# Patient Record
Sex: FEMALE | Race: WHITE | NOT HISPANIC OR LATINO | Employment: FULL TIME | URBAN - METROPOLITAN AREA
[De-identification: names, ages, dates, MRNs, and addresses within clinical notes are randomized per-mention and may not be internally consistent; named-entity substitution may affect disease eponyms.]

---

## 2017-04-04 ENCOUNTER — ALLSCRIPTS OFFICE VISIT (OUTPATIENT)
Dept: OTHER | Facility: OTHER | Age: 27
End: 2017-04-04

## 2017-05-11 ENCOUNTER — APPOINTMENT (OUTPATIENT)
Dept: LAB | Facility: CLINIC | Age: 27
End: 2017-05-11
Payer: COMMERCIAL

## 2017-05-11 ENCOUNTER — TRANSCRIBE ORDERS (OUTPATIENT)
Dept: LAB | Facility: CLINIC | Age: 27
End: 2017-05-11

## 2017-05-11 DIAGNOSIS — E03.9 UNSPECIFIED HYPOTHYROIDISM: Primary | ICD-10-CM

## 2017-05-11 LAB — TSH SERPL DL<=0.05 MIU/L-ACNC: 2.45 UIU/ML (ref 0.36–3.74)

## 2017-05-11 PROCEDURE — 84443 ASSAY THYROID STIM HORMONE: CPT

## 2017-05-11 PROCEDURE — 36415 COLL VENOUS BLD VENIPUNCTURE: CPT

## 2017-05-25 ENCOUNTER — ALLSCRIPTS OFFICE VISIT (OUTPATIENT)
Dept: OTHER | Facility: OTHER | Age: 27
End: 2017-05-25

## 2017-05-25 DIAGNOSIS — Z12.31 ENCOUNTER FOR SCREENING MAMMOGRAM FOR MALIGNANT NEOPLASM OF BREAST: ICD-10-CM

## 2017-05-25 PROCEDURE — 87491 CHLMYD TRACH DNA AMP PROBE: CPT | Performed by: OBSTETRICS & GYNECOLOGY

## 2017-05-25 PROCEDURE — G0145 SCR C/V CYTO,THINLAYER,RESCR: HCPCS | Performed by: OBSTETRICS & GYNECOLOGY

## 2017-05-25 PROCEDURE — 87591 N.GONORRHOEAE DNA AMP PROB: CPT | Performed by: OBSTETRICS & GYNECOLOGY

## 2017-05-27 ENCOUNTER — LAB REQUISITION (OUTPATIENT)
Dept: LAB | Facility: HOSPITAL | Age: 27
End: 2017-05-27
Payer: COMMERCIAL

## 2017-05-27 DIAGNOSIS — Z11.3 ENCOUNTER FOR SCREENING FOR INFECTIONS WITH PREDOMINANTLY SEXUAL MODE OF TRANSMISSION: ICD-10-CM

## 2017-05-27 DIAGNOSIS — Z12.4 ENCOUNTER FOR SCREENING FOR MALIGNANT NEOPLASM OF CERVIX: ICD-10-CM

## 2017-06-01 LAB
CHLAMYDIA DNA CVX QL NAA+PROBE: ABNORMAL
N GONORRHOEA DNA GENITAL QL NAA+PROBE: ABNORMAL

## 2017-06-09 LAB
LAB AP GYN PRIMARY INTERPRETATION: NORMAL
LAB AP LMP: NORMAL
Lab: NORMAL

## 2017-06-23 ENCOUNTER — GENERIC CONVERSION - ENCOUNTER (OUTPATIENT)
Dept: OTHER | Facility: OTHER | Age: 27
End: 2017-06-23

## 2017-07-20 ENCOUNTER — ALLSCRIPTS OFFICE VISIT (OUTPATIENT)
Dept: OTHER | Facility: OTHER | Age: 27
End: 2017-07-20

## 2017-07-21 ENCOUNTER — LAB REQUISITION (OUTPATIENT)
Dept: LAB | Facility: HOSPITAL | Age: 27
End: 2017-07-21
Payer: COMMERCIAL

## 2017-07-21 DIAGNOSIS — Z11.3 ENCOUNTER FOR SCREENING FOR INFECTIONS WITH PREDOMINANTLY SEXUAL MODE OF TRANSMISSION: ICD-10-CM

## 2017-07-21 PROCEDURE — 87491 CHLMYD TRACH DNA AMP PROBE: CPT | Performed by: OBSTETRICS & GYNECOLOGY

## 2017-07-21 PROCEDURE — 87591 N.GONORRHOEAE DNA AMP PROB: CPT | Performed by: OBSTETRICS & GYNECOLOGY

## 2017-07-24 LAB
CHLAMYDIA DNA CVX QL NAA+PROBE: NORMAL
N GONORRHOEA DNA GENITAL QL NAA+PROBE: NORMAL

## 2017-08-01 ENCOUNTER — GENERIC CONVERSION - ENCOUNTER (OUTPATIENT)
Dept: OTHER | Facility: OTHER | Age: 27
End: 2017-08-01

## 2017-08-25 ENCOUNTER — ALLSCRIPTS OFFICE VISIT (OUTPATIENT)
Dept: OTHER | Facility: OTHER | Age: 27
End: 2017-08-25

## 2018-01-11 NOTE — MISCELLANEOUS
Message   Recorded as Task   Date: 07/31/2017 06:44 AM, Created By: Maureen Hastings   Task Name: Go to Result   Assigned To: Rossana Burgess   Regarding Patient: Carlos Srivastava, Status: In Progress   Asuncion Mansfield - 31 Jul 2017 6:44 AM     TASK CREATED  negative chlamydia and gonorrhea   Kyra Suh - 01 Aug 2017 12:56 PM     TASK IN PROGRESS   Pt informed  Active Problems    1  Celiac disease (579 0) (K90 0)   2  Cervical cancer screening (V76 2) (Z12 4)   3  Chlamydia (079 98) (A74 9)   4  Encounter for routine gynecological examination (V72 31) (Z01 419)   5  Hypothyroidism (244 9) (E03 9)   6  Screening for STDs (sexually transmitted diseases) (V74 5) (Z11 3)   7  Visit for screening mammogram (V76 12) (Z12 31)    Current Meds   1  Levothyroxine Sodium 25 MCG Oral Tablet (Synthroid); take 1 tablet every day; Therapy: 01ACY2956 to (Opal Solum)  Requested for: 27EQW3278; Last   Rx:[id number] Ordered   2  Womens One Daily TABS; TAKE 1 TABLET DAILY; Therapy: (Recorded:04Apr2017) to Recorded    Allergies    1  No Known Drug Allergies    2  Gluten   3   Pollen    Signatures   Electronically signed by : Butch Blackwell, ; Aug  1 2017  4:27PM EST                       (Author)

## 2018-01-12 VITALS
HEIGHT: 65 IN | WEIGHT: 129.5 LBS | HEART RATE: 72 BPM | RESPIRATION RATE: 16 BRPM | DIASTOLIC BLOOD PRESSURE: 64 MMHG | SYSTOLIC BLOOD PRESSURE: 120 MMHG | TEMPERATURE: 98.5 F | BODY MASS INDEX: 21.58 KG/M2

## 2018-01-12 VITALS
HEIGHT: 65 IN | BODY MASS INDEX: 21.68 KG/M2 | WEIGHT: 130.13 LBS | SYSTOLIC BLOOD PRESSURE: 119 MMHG | DIASTOLIC BLOOD PRESSURE: 70 MMHG

## 2018-01-14 VITALS
BODY MASS INDEX: 21.76 KG/M2 | HEART RATE: 72 BPM | RESPIRATION RATE: 16 BRPM | HEIGHT: 65 IN | WEIGHT: 130.6 LBS | DIASTOLIC BLOOD PRESSURE: 64 MMHG | SYSTOLIC BLOOD PRESSURE: 114 MMHG

## 2018-01-14 VITALS
BODY MASS INDEX: 21.91 KG/M2 | SYSTOLIC BLOOD PRESSURE: 120 MMHG | DIASTOLIC BLOOD PRESSURE: 76 MMHG | WEIGHT: 131.5 LBS | HEIGHT: 65 IN

## 2018-01-14 NOTE — MISCELLANEOUS
Message   Date: 23 Jun 2017 2:47 PM EST, Recorded By: Audra Longo For: Regi Khoi: Liza Montilla, Self   Phone: [phone] (Home)   Reason: Results Inquiry   Patient called for pap result  Informed WNL, letter is in the mail  Also discussed + Chlamydia  Pt has Rx and will take it today  Has appointment for ADRIANNA  Active Problems    1  Celiac disease (579 0) (K90 0)   2  Cervical cancer screening (V76 2) (Z12 4)   3  Chlamydia (079 98) (A74 9)   4  Encounter for routine gynecological examination (V72 31) (Z01 419)   5  Hypothyroidism (244 9) (E03 9)   6  Screening for STDs (sexually transmitted diseases) (V74 5) (Z11 3)   7  Visit for screening mammogram (V76 12) (Z12 31)    Current Meds   1  Azithromycin 500 MG Oral Tablet; TAKE 2 TABLET Now Take both tablets by mouth; Therapy: 66GWS1902 to (USTWZBDY:45WGH4195)  Requested for: 54TRN7765; Last   Rx:[id number] Ordered   2  Levothyroxine Sodium 25 MCG Oral Tablet (Synthroid); take 1 tablet every day; Therapy: 75VBU7312 to (Collins Etienne)  Requested for: 01COG0776; Last   Rx:[id number] Ordered   3  Womens One Daily TABS; TAKE 1 TABLET DAILY; Therapy: (Recorded:04Apr2017) to Recorded    Allergies    1  No Known Drug Allergies    2  Gluten   3   Pollen    Signatures   Electronically signed by : Jonelle Meza, ; Jun 23 2017  2:49PM EST                       (Author)

## 2018-07-25 DIAGNOSIS — E03.9 HYPOTHYROIDISM, UNSPECIFIED TYPE: Primary | ICD-10-CM

## 2018-07-26 RX ORDER — LEVOTHYROXINE SODIUM 0.03 MG/1
TABLET ORAL
Qty: 30 TABLET | Refills: 0 | Status: SHIPPED | OUTPATIENT
Start: 2018-07-26 | End: 2018-09-11 | Stop reason: SDUPTHER

## 2018-09-11 ENCOUNTER — TELEPHONE (OUTPATIENT)
Dept: FAMILY MEDICINE CLINIC | Facility: CLINIC | Age: 28
End: 2018-09-11

## 2018-09-11 DIAGNOSIS — E03.9 HYPOTHYROIDISM, UNSPECIFIED TYPE: ICD-10-CM

## 2018-09-11 DIAGNOSIS — E03.9 HYPOTHYROIDISM, UNSPECIFIED TYPE: Primary | ICD-10-CM

## 2018-09-11 RX ORDER — LEVOTHYROXINE SODIUM 0.03 MG/1
TABLET ORAL
Qty: 30 TABLET | Refills: 0 | Status: SHIPPED | OUTPATIENT
Start: 2018-09-11 | End: 2021-02-25 | Stop reason: SDUPTHER

## 2018-09-11 NOTE — TELEPHONE ENCOUNTER
Left voicemail    ----- Message from Mary Kate Radford Dr sent at 9/11/2018  9:33 AM EDT -----  Call pt: got refill request for her synthroid  I refilled for 30 days but she needs labs   I printed script

## 2018-10-17 DIAGNOSIS — E03.9 HYPOTHYROIDISM, UNSPECIFIED TYPE: ICD-10-CM

## 2018-10-17 RX ORDER — LEVOTHYROXINE SODIUM 0.03 MG/1
TABLET ORAL
Qty: 30 TABLET | Refills: 0 | OUTPATIENT
Start: 2018-10-17

## 2018-10-17 NOTE — TELEPHONE ENCOUNTER
Back on September 11 we gave her #30 and a lab slip, labs were never done   Patient needs to get labs done and make an appt we can call enough meds until this time

## 2020-08-31 ENCOUNTER — OFFICE VISIT (OUTPATIENT)
Dept: URGENT CARE | Facility: CLINIC | Age: 30
End: 2020-08-31
Payer: COMMERCIAL

## 2020-08-31 VITALS
RESPIRATION RATE: 18 BRPM | TEMPERATURE: 98.2 F | OXYGEN SATURATION: 99 % | HEART RATE: 62 BPM | HEIGHT: 65 IN | WEIGHT: 135 LBS | BODY MASS INDEX: 22.49 KG/M2

## 2020-08-31 DIAGNOSIS — Z11.59 SCREENING FOR VIRAL DISEASE: Primary | ICD-10-CM

## 2020-08-31 PROCEDURE — G0382 LEV 3 HOSP TYPE B ED VISIT: HCPCS | Performed by: FAMILY MEDICINE

## 2020-08-31 PROCEDURE — U0003 INFECTIOUS AGENT DETECTION BY NUCLEIC ACID (DNA OR RNA); SEVERE ACUTE RESPIRATORY SYNDROME CORONAVIRUS 2 (SARS-COV-2) (CORONAVIRUS DISEASE [COVID-19]), AMPLIFIED PROBE TECHNIQUE, MAKING USE OF HIGH THROUGHPUT TECHNOLOGIES AS DESCRIBED BY CMS-2020-01-R: HCPCS | Performed by: FAMILY MEDICINE

## 2020-08-31 RX ORDER — LEVOTHYROXINE SODIUM 0.03 MG/1
1 TABLET ORAL DAILY
COMMUNITY
Start: 2013-03-25 | End: 2021-02-25 | Stop reason: SDUPTHER

## 2020-08-31 NOTE — PROGRESS NOTES
[facility] [facility]        NAME: Brittney Gar is a 27 y o  female  : 1990    MRN: [id number]  DATE: 2020  TIME: 3:49 PM    Assessment and Plan   Screening for viral disease [Z11 59]  1  Screening for viral disease  PAT Covid Screening         Patient Instructions   There are no Patient Instructions on file for this visit  Proceed to  ER if symptoms worsen  Chief Complaint     Chief Complaint   Patient presents with   [address] [place] [address] screening         History of Present Illness       Patient presents for pre-employment COVID testing  Patient denies any symptoms, no fever no chills no cough no shortness of breath no fever no new onset loss of taste or smell no abdominal discomfort nausea vomiting or diarrhea  She feels well and will be applying for a job as a   All vital signs stable      Review of Systems   Review of Systems   Constitutional: Negative  HENT: Negative  Eyes: Negative  Respiratory: Negative  Cardiovascular: Negative  Gastrointestinal: Negative  Musculoskeletal: Negative  Neurological: Negative  Current Medications       Current Outpatient Medications:     levothyroxine 25 mcg tablet, Take 1 tablet by mouth daily, Disp: , Rfl:     levothyroxine 25 mcg tablet, TAKE 1 TABLET BY MOUTH EVERY DAY, Disp: 30 tablet, Rfl: 0    Current Allergies     Allergies as of 2020 - Reviewed 2020   Allergen Reaction Noted    Gluten meal Anxiety, GI Intolerance, and Rash 2017            The following portions of the patient's history were reviewed and updated as appropriate: allergies, current medications, past family history, past medical history, past social history, past surgical history and problem list      No past medical history on file  No past surgical history on file  No family history on file  Medications have been verified          Objective   Pulse 62   Temp 98 2 °F (36 8 °C)   Resp 18   Ht 5' 5" (1 651 m)   Wt 61 2 kg (135 lb)   SpO2 99%   BMI 22 47 kg/m²        Physical Exam     Physical Exam  Constitutional:       General: She is not in acute distress  Appearance: Normal appearance  She is not ill-appearing  Neurological:      Mental Status: She is alert

## 2020-09-01 LAB — SARS-COV-2 RNA RESP QL NAA+PROBE: NEGATIVE

## 2021-02-02 ENCOUNTER — NURSE TRIAGE (OUTPATIENT)
Dept: OTHER | Facility: OTHER | Age: 31
End: 2021-02-02

## 2021-02-02 DIAGNOSIS — Z11.9 ENCOUNTER FOR SCREENING FOR INFECTIOUS AND PARASITIC DISEASES, UNSPECIFIED: Primary | ICD-10-CM

## 2021-02-02 NOTE — TELEPHONE ENCOUNTER
Pt is requesting a covid test and does not have a [facility] PCP  Order placed  Pt informed of closest testing site and was advised of hours of operation, address, to wear a mask, and to stay in the car  Pt already has a [employer] account to check for results  Pt does not wish to establish care with a [facility] PCP at this time  (0) independent

## 2021-02-02 NOTE — TELEPHONE ENCOUNTER
Reason for Disposition   [1] COVID-19 EXPOSURE (Close Contact) AND [2] within last 14 days BUT [3] NO symptoms    Answer Assessment - Initial Assessment Questions  Were you within 6 feet or less, for up to 15 minutes or more with a person that has a confirmed COVID-19 test?        Yes    What was the date of your exposure? 1/27/21    Are you experiencing any symptoms attributed to the virus?  (Assess for SOB, cough, fever, difficulty breathing)        Denies    HIGH RISK: Do you have any history heart or lung conditions, weakened immune system, diabetes, Asthma, CHF, HIV, COPD, Chemo, renal failure, sickle cell, etc?        Denies    PREGNANCY: Are you pregnant or did you recently give birth?         Denies    Protocols used: CORONAVIRUS (COVID-19) EXPOSURE-ADULT-[place]

## 2021-02-02 NOTE — TELEPHONE ENCOUNTER
Regarding: COVID - Exposure  ----- Message from Darron Lopez sent at 2/2/2021 12:14 PM EST -----  "I was exposed to someone who tested positive - I was in there house for an extended period of time"     NOTE: She was not within 6 ft of this person - she had a mask on and the other person didn't

## 2021-02-25 ENCOUNTER — OFFICE VISIT (OUTPATIENT)
Dept: FAMILY MEDICINE CLINIC | Facility: CLINIC | Age: 31
End: 2021-02-25
Payer: COMMERCIAL

## 2021-02-25 VITALS
TEMPERATURE: 98.9 F | SYSTOLIC BLOOD PRESSURE: 110 MMHG | HEIGHT: 65 IN | DIASTOLIC BLOOD PRESSURE: 70 MMHG | RESPIRATION RATE: 14 BRPM | HEART RATE: 60 BPM | BODY MASS INDEX: 21.19 KG/M2 | WEIGHT: 127.2 LBS

## 2021-02-25 DIAGNOSIS — E03.8 OTHER SPECIFIED HYPOTHYROIDISM: ICD-10-CM

## 2021-02-25 DIAGNOSIS — E03.9 HYPOTHYROIDISM, UNSPECIFIED TYPE: Primary | ICD-10-CM

## 2021-02-25 DIAGNOSIS — Z23 NEED FOR TETANUS BOOSTER: ICD-10-CM

## 2021-02-25 PROBLEM — J30.9 ALLERGIC RHINITIS: Status: ACTIVE | Noted: 2017-08-25

## 2021-02-25 PROBLEM — K90.0 CELIAC DISEASE: Status: ACTIVE | Noted: 2017-04-04

## 2021-02-25 PROCEDURE — 99203 OFFICE O/P NEW LOW 30 MIN: CPT | Performed by: PHYSICIAN ASSISTANT

## 2021-02-25 PROCEDURE — 90471 IMMUNIZATION ADMIN: CPT

## 2021-02-25 PROCEDURE — 90714 TD VACC NO PRESV 7 YRS+ IM: CPT

## 2021-02-25 PROCEDURE — 1036F TOBACCO NON-USER: CPT | Performed by: PHYSICIAN ASSISTANT

## 2021-02-25 PROCEDURE — 3725F SCREEN DEPRESSION PERFORMED: CPT | Performed by: PHYSICIAN ASSISTANT

## 2021-02-25 PROCEDURE — 3008F BODY MASS INDEX DOCD: CPT | Performed by: PHYSICIAN ASSISTANT

## 2021-02-25 RX ORDER — LEVOTHYROXINE SODIUM 0.03 MG/1
25 TABLET ORAL DAILY
Qty: 90 TABLET | Refills: 1 | Status: SHIPPED | OUTPATIENT
Start: 2021-02-25 | End: 2021-09-20

## 2021-02-25 NOTE — PROGRESS NOTES
Assessment/Plan:    1  Hypothyroidism, unspecified type    - continue with levothyroxine 25 mcg daily, repeat TSH yearly in summer to stay on same schedule  - levothyroxine 25 mcg tablet; Take 1 tablet (25 mcg total) by mouth daily  Dispense: 90 tablet; Refill: 1  - TSH, 3rd generation with Free T4 reflex; Future    2  Need for tetanus booster    - TD VACCINE GREATER THAN OR EQUAL TO 8YO PRESERVATIVE FREE IM    PAP due 2022 with Dr Jaimee Bender    F/u 1 year or sooner if needed    Subjective:   Chief Complaint   Patient presents with    Returning pt to be established      Patient ID: Lesli Bray is a 27 y o  female  Patient here for refills levothyroxine, started 2011, been on the same dose since then  Gets labs done yearly in summer  No complaints at this time  Here to re establish care        The following portions of the patient's history were reviewed and updated as appropriate: allergies, current medications, past family history, past medical history, past social history, past surgical history and problem list     Past Medical History:   Diagnosis Date    Disease of thyroid gland      Past Surgical History:   Procedure Laterality Date    WISDOM TOOTH EXTRACTION       Family History   Problem Relation Age of Onset    No Known Problems Mother     Anxiety disorder Father     No Known Problems Sister     No Known Problems Brother     Alcohol abuse Neg Hx     Substance Abuse Neg Hx      Social History     Socioeconomic History    Marital status: Single     Spouse name: Not on file    Number of children: Not on file    Years of education: Not on file    Highest education level: Not on file   Occupational History    Not on file   Social Needs    Financial resource strain: Not on file    Food insecurity     Worry: Not on file     Inability: Not on file    Transportation needs     Medical: Not on file     Non-medical: Not on file   Tobacco Use    Smoking status: Never Smoker    Smokeless tobacco: Never Used   Substance and Sexual Activity    Alcohol use: Yes     Comment: Socially    Drug use: Never    Sexual activity: Yes     Partners: Male   Lifestyle    Physical activity     Days per week: Not on file     Minutes per session: Not on file    Stress: Not on file   Relationships    Social connections     Talks on phone: Not on file     Gets together: Not on file     Attends Holiness service: Not on file     Active member of club or organization: Not on file     Attends meetings of clubs or organizations: Not on file     Relationship status: Not on file    Intimate partner violence     Fear of current or ex partner: Not on file     Emotionally abused: Not on file     Physically abused: Not on file     Forced sexual activity: Not on file   Other Topics Concern    Not on file   Social History Narrative    Not on file       Current Outpatient Medications:     levothyroxine 25 mcg tablet, Take 1 tablet by mouth daily, Disp: , Rfl:     Multiple Vitamins-Minerals (ONE-A-DAY WOMENS PO), Take by mouth, Disp: , Rfl:     levothyroxine 25 mcg tablet, TAKE 1 TABLET BY MOUTH EVERY DAY, Disp: 30 tablet, Rfl: 0    Review of Systems   Constitutional: Negative for chills and fever  HENT: Negative for ear pain and sore throat  Eyes: Negative for pain and visual disturbance  Respiratory: Negative for cough and shortness of breath  Cardiovascular: Negative for chest pain and palpitations  Gastrointestinal: Negative for abdominal pain and vomiting  Genitourinary: Negative for dysuria and hematuria  Musculoskeletal: Negative for arthralgias and back pain  Skin: Negative for color change and rash  Neurological: Negative for seizures and syncope  All other systems reviewed and are negative              Objective:    Vitals:    02/25/21 1129   BP: 110/70   BP Location: Left arm   Patient Position: Sitting   Cuff Size: Standard   Pulse: 60   Resp: 14   Temp: 98 9 °F (37 2 °C)   TempSrc: Oral   Weight: 57 7 kg (127 lb 3 2 oz)   Height: 5' 5 25" (1 657 m)        Physical Exam  Constitutional:       Appearance: Normal appearance  She is well-developed and normal weight  Neck:      Musculoskeletal: Normal range of motion and neck supple  Cardiovascular:      Rate and Rhythm: Normal rate and regular rhythm  Pulses: Normal pulses  Heart sounds: Normal heart sounds  Pulmonary:      Effort: Pulmonary effort is normal       Breath sounds: Normal breath sounds  Lymphadenopathy:      Cervical: No cervical adenopathy  Skin:     General: Skin is warm  Neurological:      General: No focal deficit present  Mental Status: She is alert and oriented to person, place, and time  Psychiatric:         Mood and Affect: Mood normal          Behavior: Behavior normal          Thought Content:  Thought content normal          Judgment: Judgment normal

## 2021-03-31 DIAGNOSIS — Z23 ENCOUNTER FOR IMMUNIZATION: ICD-10-CM

## 2021-04-15 ENCOUNTER — IMMUNIZATIONS (OUTPATIENT)
Dept: FAMILY MEDICINE CLINIC | Facility: HOSPITAL | Age: 31
End: 2021-04-15

## 2021-04-15 DIAGNOSIS — Z23 ENCOUNTER FOR IMMUNIZATION: Primary | ICD-10-CM

## 2021-04-15 PROCEDURE — 0011A SARS-COV-2 / COVID-19 MRNA VACCINE (MODERNA) 100 MCG: CPT

## 2021-04-15 PROCEDURE — 91301 SARS-COV-2 / COVID-19 MRNA VACCINE (MODERNA) 100 MCG: CPT

## 2021-05-19 ENCOUNTER — IMMUNIZATIONS (OUTPATIENT)
Dept: FAMILY MEDICINE CLINIC | Facility: HOSPITAL | Age: 31
End: 2021-05-19

## 2021-05-19 DIAGNOSIS — Z23 ENCOUNTER FOR IMMUNIZATION: Primary | ICD-10-CM

## 2021-05-19 PROCEDURE — 91301 SARS-COV-2 / COVID-19 MRNA VACCINE (MODERNA) 100 MCG: CPT

## 2021-05-19 PROCEDURE — 0012A SARS-COV-2 / COVID-19 MRNA VACCINE (MODERNA) 100 MCG: CPT

## 2021-06-17 ENCOUNTER — OFFICE VISIT (OUTPATIENT)
Dept: FAMILY MEDICINE CLINIC | Facility: CLINIC | Age: 31
End: 2021-06-17
Payer: COMMERCIAL

## 2021-06-17 VITALS
RESPIRATION RATE: 16 BRPM | DIASTOLIC BLOOD PRESSURE: 72 MMHG | HEIGHT: 65 IN | SYSTOLIC BLOOD PRESSURE: 100 MMHG | HEART RATE: 70 BPM | BODY MASS INDEX: 21.36 KG/M2 | TEMPERATURE: 99.6 F | WEIGHT: 128.2 LBS

## 2021-06-17 DIAGNOSIS — J02.9 SORE THROAT: ICD-10-CM

## 2021-06-17 DIAGNOSIS — R09.81 SINUS CONGESTION: Primary | ICD-10-CM

## 2021-06-17 LAB — S PYO AG THROAT QL: NEGATIVE

## 2021-06-17 PROCEDURE — 99214 OFFICE O/P EST MOD 30 MIN: CPT | Performed by: FAMILY MEDICINE

## 2021-06-17 PROCEDURE — 87880 STREP A ASSAY W/OPTIC: CPT | Performed by: FAMILY MEDICINE

## 2021-06-17 PROCEDURE — 1036F TOBACCO NON-USER: CPT | Performed by: FAMILY MEDICINE

## 2021-06-17 PROCEDURE — 3008F BODY MASS INDEX DOCD: CPT | Performed by: FAMILY MEDICINE

## 2021-06-17 PROCEDURE — 87070 CULTURE OTHR SPECIMN AEROBIC: CPT | Performed by: FAMILY MEDICINE

## 2021-06-17 RX ORDER — MOMETASONE FUROATE 50 UG/1
1 SPRAY, METERED NASAL 2 TIMES DAILY
Qty: 17 G | Refills: 1 | Status: SHIPPED | OUTPATIENT
Start: 2021-06-17 | End: 2021-08-19 | Stop reason: ALTCHOICE

## 2021-06-17 NOTE — PROGRESS NOTES
Assessment/Plan:    URI/sinus congestion  Most likely common cold  Patient will get Nasacort prescription and do 1 spray twice daily  She will continue Mucinex 1 pill with 8 oz water twice daily    If this last more than 10 days and she starts getting thick yellow or green discharge throughout the entire day not just the morning she will let me know    Recheck as scheduled or as needed               Subjective:   Richard Conley is a 32 y  o female  Chief Complaint   Patient presents with    Sinusitis    Sore Throat     2 days ago patient started with a sore throat   Last week actually week from today she was with a friend who had 2 small children with runny nose is who go to   She was told that they have allergies    Last night patient was more exhausted than usual when coming home from work  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 her  She went right to bed because of increased sinus pressure  Now she has drainage in she is getting cough along with it  She has pressure in her frontal sinuses as well as her ethmoid sinuses    She has tried Mucinex Tylenol Claritin Zyrtec    No fever no chills      Past medical history, social history, and family history reviewed as appropriate for the complaint of this patient  MEDICATIONS REVIEWED AND UPDATED    10 point review of systems performed, the remainder of the ROS is negative except for what is noted in the history of chief complaint    Objective:    Vitals:    06/17/21 1456   BP: 100/72   Pulse: 70   Resp: 16   Temp: 99 6 °F (37 6 °C)     Body mass index is 21 33 kg/m²      Physical Exam    Constitutional  Patient appears fatigued but no acute distress     Mental Status  Alert, Oriented, Cooperative, Memory function normal , clean, and reasonable    HEENT  TMs are perfect, turbinates large red watery discharge pharynx benign  Frontal sinnus tenderness to palpation    Neck  No neck mass, No thyromegaly, Good carotid upstrokes bilaterally, trachea midline positive click    Respiratory  Breath sounds normal, No rales, No rhonchi, No wheezing, normal palpation    Cardiac   Regular rhythm without ectopy or murmur no S3-S4, no heave lift or thrill to palpation    Vascular  No leg edema, No pedal edema    Muscular skeletal  No clubbing cyanosis , muscle tone normal    Skin  No appreciable rashes or abnormal appearing lesions Anesthesia Volume In Cc: 0 Destruction After The Procedure: No Electrodesiccation Text: The wound bed was treated with electrodesiccation after the biopsy was performed. Notification Instructions: Patient will be notified of biopsy results. However, patient instructed to call the office if not contacted within 2 weeks. Hemostasis: Drysol Post-Care Instructions: I reviewed with the patient in detail post-care instructions. Patient is to keep the biopsy site dry overnight, and then apply bacitracin twice daily until healed. Patient may apply hydrogen peroxide soaks to remove any crusting. Detail Level: Detailed Dressing: bandage Cryotherapy Text: The wound bed was treated with cryotherapy after the biopsy was performed. Billing Type: Third-Party Bill Biopsy Type: H and E Electrodesiccation And Curettage Text: The wound bed was treated with electrodesiccation and curettage after the biopsy was performed. Anesthesia Type: 1% lidocaine with epinephrine Was A Bandage Applied: Yes Type Of Destruction Used: Curettage Wound Care: Vaseline Curettage Text: The wound bed was treated with curettage after the biopsy was performed. Depth Of Biopsy: dermis Consent: Written consent was obtained and risks were reviewed including but not limited to scarring, infection, bleeding, scabbing, incomplete removal, nerve damage and allergy to anesthesia. Silver Nitrate Text: The wound bed was treated with silver nitrate after the biopsy was performed. Biopsy Method: Dermablade

## 2021-06-17 NOTE — PATIENT INSTRUCTIONS
Patient will get Nasacort prescription and do 1 spray twice daily  She will continue Mucinex 1 pill with 8 oz water twice daily    If this last more than 10 days and she starts getting thick yellow or green discharge throughout the entire day not just the morning she will let me know    Recheck as scheduled or as needed

## 2021-06-20 LAB — BACTERIA THROAT CULT: NORMAL

## 2021-08-19 ENCOUNTER — OFFICE VISIT (OUTPATIENT)
Dept: FAMILY MEDICINE CLINIC | Facility: CLINIC | Age: 31
End: 2021-08-19
Payer: COMMERCIAL

## 2021-08-19 ENCOUNTER — APPOINTMENT (OUTPATIENT)
Dept: LAB | Facility: CLINIC | Age: 31
End: 2021-08-19
Payer: COMMERCIAL

## 2021-08-19 VITALS
BODY MASS INDEX: 21.51 KG/M2 | TEMPERATURE: 98.1 F | DIASTOLIC BLOOD PRESSURE: 84 MMHG | RESPIRATION RATE: 16 BRPM | SYSTOLIC BLOOD PRESSURE: 110 MMHG | OXYGEN SATURATION: 97 % | HEIGHT: 65 IN | HEART RATE: 76 BPM | WEIGHT: 129.13 LBS

## 2021-08-19 DIAGNOSIS — Z91.89 HISTORY OF SEXUAL INTERCOURSE: ICD-10-CM

## 2021-08-19 DIAGNOSIS — J30.1 SEASONAL ALLERGIC RHINITIS DUE TO POLLEN: ICD-10-CM

## 2021-08-19 DIAGNOSIS — N76.0 ACUTE VAGINITIS: ICD-10-CM

## 2021-08-19 DIAGNOSIS — R53.82 CHRONIC FATIGUE: ICD-10-CM

## 2021-08-19 DIAGNOSIS — E03.8 OTHER SPECIFIED HYPOTHYROIDISM: ICD-10-CM

## 2021-08-19 DIAGNOSIS — Z00.00 ANNUAL PHYSICAL EXAM: Primary | ICD-10-CM

## 2021-08-19 DIAGNOSIS — K90.0 CELIAC DISEASE: ICD-10-CM

## 2021-08-19 LAB
25(OH)D3 SERPL-MCNC: 40.9 NG/ML (ref 30–100)
ALBUMIN SERPL BCP-MCNC: 4 G/DL (ref 3.5–5)
ALP SERPL-CCNC: 58 U/L (ref 46–116)
ALT SERPL W P-5'-P-CCNC: 19 U/L (ref 12–78)
ANION GAP SERPL CALCULATED.3IONS-SCNC: 7 MMOL/L (ref 4–13)
ANISOCYTOSIS BLD QL SMEAR: PRESENT
ARTIFACT: PRESENT
AST SERPL W P-5'-P-CCNC: 15 U/L (ref 5–45)
BASOPHILS # BLD MANUAL: 0.14 THOUSAND/UL (ref 0–0.1)
BASOPHILS NFR MAR MANUAL: 2 % (ref 0–1)
BILIRUB SERPL-MCNC: 0.61 MG/DL (ref 0.2–1)
BUN SERPL-MCNC: 13 MG/DL (ref 5–25)
CALCIUM SERPL-MCNC: 9.2 MG/DL (ref 8.3–10.1)
CHLORIDE SERPL-SCNC: 106 MMOL/L (ref 100–108)
CO2 SERPL-SCNC: 25 MMOL/L (ref 21–32)
CREAT SERPL-MCNC: 0.72 MG/DL (ref 0.6–1.3)
EOSINOPHIL # BLD MANUAL: 0.27 THOUSAND/UL (ref 0–0.4)
EOSINOPHIL NFR BLD MANUAL: 4 % (ref 0–6)
ERYTHROCYTE [DISTWIDTH] IN BLOOD BY AUTOMATED COUNT: 12.4 % (ref 11.6–15.1)
GFR SERPL CREATININE-BSD FRML MDRD: 112 ML/MIN/1.73SQ M
GLUCOSE P FAST SERPL-MCNC: 59 MG/DL (ref 65–99)
HCT VFR BLD AUTO: 44 % (ref 34.8–46.1)
HGB BLD-MCNC: 14.5 G/DL (ref 11.5–15.4)
LYMPHOCYTES # BLD AUTO: 1.01 THOUSAND/UL (ref 0.6–4.47)
LYMPHOCYTES # BLD AUTO: 15 % (ref 14–44)
MACROCYTES BLD QL AUTO: PRESENT
MCH RBC QN AUTO: 34.4 PG (ref 26.8–34.3)
MCHC RBC AUTO-ENTMCNC: 33 G/DL (ref 31.4–37.4)
MCV RBC AUTO: 105 FL (ref 82–98)
MONOCYTES # BLD AUTO: 0.2 THOUSAND/UL (ref 0–1.22)
MONOCYTES NFR BLD: 3 % (ref 4–12)
NEUTROPHILS # BLD MANUAL: 4.79 THOUSAND/UL (ref 1.85–7.62)
NEUTS BAND NFR BLD MANUAL: 3 % (ref 0–8)
NEUTS SEG NFR BLD AUTO: 68 % (ref 43–75)
PLATELET # BLD AUTO: 211 THOUSANDS/UL (ref 149–390)
PLATELET BLD QL SMEAR: ADEQUATE
PMV BLD AUTO: 11.2 FL (ref 8.9–12.7)
POLYCHROMASIA BLD QL SMEAR: PRESENT
POTASSIUM SERPL-SCNC: 3.9 MMOL/L (ref 3.5–5.3)
PROT SERPL-MCNC: 7.1 G/DL (ref 6.4–8.2)
RBC # BLD AUTO: 4.21 MILLION/UL (ref 3.81–5.12)
RBC MORPH BLD: PRESENT
SODIUM SERPL-SCNC: 138 MMOL/L (ref 136–145)
VARIANT LYMPHS # BLD AUTO: 5 %
WBC # BLD AUTO: 6.75 THOUSAND/UL (ref 4.31–10.16)

## 2021-08-19 PROCEDURE — 3008F BODY MASS INDEX DOCD: CPT | Performed by: NURSE PRACTITIONER

## 2021-08-19 PROCEDURE — 87536 HIV-1 QUANT&REVRSE TRNSCRPJ: CPT

## 2021-08-19 PROCEDURE — 36415 COLL VENOUS BLD VENIPUNCTURE: CPT

## 2021-08-19 PROCEDURE — 87591 N.GONORRHOEAE DNA AMP PROB: CPT

## 2021-08-19 PROCEDURE — 85027 COMPLETE CBC AUTOMATED: CPT

## 2021-08-19 PROCEDURE — 1036F TOBACCO NON-USER: CPT | Performed by: NURSE PRACTITIONER

## 2021-08-19 PROCEDURE — 87491 CHLMYD TRACH DNA AMP PROBE: CPT

## 2021-08-19 PROCEDURE — 87529 HSV DNA AMP PROBE: CPT

## 2021-08-19 PROCEDURE — 86592 SYPHILIS TEST NON-TREP QUAL: CPT

## 2021-08-19 PROCEDURE — 82306 VITAMIN D 25 HYDROXY: CPT

## 2021-08-19 PROCEDURE — 99395 PREV VISIT EST AGE 18-39: CPT | Performed by: NURSE PRACTITIONER

## 2021-08-19 PROCEDURE — 86618 LYME DISEASE ANTIBODY: CPT

## 2021-08-19 PROCEDURE — 99214 OFFICE O/P EST MOD 30 MIN: CPT | Performed by: NURSE PRACTITIONER

## 2021-08-19 PROCEDURE — 85007 BL SMEAR W/DIFF WBC COUNT: CPT

## 2021-08-19 PROCEDURE — 80053 COMPREHEN METABOLIC PANEL: CPT

## 2021-08-19 RX ORDER — FLUCONAZOLE 150 MG/1
150 TABLET ORAL ONCE
Qty: 2 TABLET | Refills: 0 | Status: SHIPPED | OUTPATIENT
Start: 2021-08-19 | End: 2021-08-19

## 2021-08-19 NOTE — PROGRESS NOTES
Assessment/Plan:     Diagnoses and all orders for this visit:    Chronic fatigue  -     CBC and differential; Future  -     Comprehensive metabolic panel; Future  -     Vitamin D 25 hydroxy; Future  -     Lyme Total Antibody Profile with reflex to WB; Future    Baseline labs to r/o any underlying etiology  Pt encouraged to start being more consistent with multivitamin use  We will contact her with results once available  Other specified hypothyroidism    Pt currently on Levothyroxine 25 mcg  TSH reprinted  We will contact pt with results once available  Celiac disease    Stable at this time  Continue to avoid gluten  Seasonal allergic rhinitis due to pollen    History of sexual intercourse  -     Chlamydia/GC amplified DNA by PCR; Future  -     HIV-1 RNA, quantitative, PCR; Future  -     RPR; Future  -     HSV TYPE 1,2 DNA PCR; Future    STD screening ordered to be thorough  Acute vaginitis  -     fluconazole (DIFLUCAN) 150 mg tablet; Take 1 tablet (150 mg total) by mouth once for 1 dose Take 1 tablet 150mg total by mouth for 1 dose  If not better, take another 150 mg tablet in 3 days  Unfortunately with patient being on her menstrual cycle, we are unable to obtain vaginal culture at today's visit  Pt to take Diflucan 150 mg tablet today  If no better after 3 days, patient can take an additional 150 mg tablet  Pt to contact our office for any persistent or worsening symptoms  Patient states they understand and agree with treatment plan  Pt to f/u PRN  Subjective:      Patient ID: Pascale Schulz is a 32 y o  female  Pt presents today for complaints of white clumpy cottage cheese like vaginal discharge  She notes that she has tried OTC 3-day Metrogel vaginal applications over the last month which have initially helped her symptoms however, her symptoms would then reappear  She denies fever, chills, urinary frequency/urgency, dysuria, change in bowel or bladder habits    She is currently on her menstrual period and states this has been regular  Pt is currently in a monogamous relationship since April  She had STD testing done prior to the start of this relationship  On separate note, pt does note chronic fatigue over the last year  She notes she gets 8 hours of sleep, eats and drinks well  Pt has 1 cup of coffee in the morning and an occasional glass of wine  She is on Thyroid medication, but is requesting other lab work to be added to see if there is anything else going on to make her fatigued  The following portions of the patient's history were reviewed and updated as appropriate: allergies, current medications, past family history, past medical history, past social history, past surgical history and problem list     Review of Systems   Constitutional: Positive for fatigue (chronic over the last year)  Negative for chills and fever  HENT: Negative for ear pain and sore throat  Eyes: Negative for pain and visual disturbance  Respiratory: Negative for cough and shortness of breath  Cardiovascular: Negative for chest pain and palpitations  Gastrointestinal: Negative for abdominal pain and vomiting  Genitourinary: Positive for vaginal bleeding (pt currently on menstrual period) and vaginal discharge (white thick cottage cheese like vaginal discharge, some pruritus as well)  Negative for decreased urine volume, difficulty urinating, dysuria, flank pain, frequency, genital sores, hematuria, pelvic pain, urgency and vaginal pain  Musculoskeletal: Negative for arthralgias and back pain  Skin: Negative for color change and rash  Neurological: Negative for seizures and syncope  All other systems reviewed and are negative  Objective:      /84   Pulse 76   Temp 98 1 °F (36 7 °C)   Resp 16   Ht 5' 5" (1 651 m)   Wt 58 6 kg (129 lb 2 oz)   SpO2 97%   BMI 21 49 kg/m²          Physical Exam  Vitals reviewed     Constitutional:       General: She is not in acute distress  Appearance: Normal appearance  She is not ill-appearing  HENT:      Head: Normocephalic  Cardiovascular:      Rate and Rhythm: Normal rate and regular rhythm  Pulses: Normal pulses  Heart sounds: Normal heart sounds  No murmur heard  Pulmonary:      Effort: Pulmonary effort is normal  No respiratory distress  Breath sounds: Normal breath sounds  No wheezing  Abdominal:      General: There is no distension  Palpations: There is no mass  Tenderness: There is no abdominal tenderness  There is no guarding or rebound  Hernia: No hernia is present  Musculoskeletal:         General: Normal range of motion  Right lower leg: No edema  Left lower leg: No edema  Skin:     General: Skin is warm and dry  Neurological:      Mental Status: She is alert and oriented to person, place, and time  Mental status is at baseline  Psychiatric:         Mood and Affect: Mood normal          Behavior: Behavior normal          Thought Content:  Thought content normal          Judgment: Judgment normal

## 2021-08-19 NOTE — PATIENT INSTRUCTIONS

## 2021-08-19 NOTE — PROGRESS NOTES
ADULT ANNUAL PHYSICAL  [facility]    NAME: Sanjana Sevilla  AGE: 32 y o  SEX: female  : 1990     DATE: 2021     Assessment and Plan:  1  PAP due  Pt sees GYN  2  Labs ordered  3  Pt to f/u in 1 year for annual physical or sooner PRN  Problem List Items Addressed This Visit        Digestive    Celiac disease       Endocrine    Other specified hypothyroidism       Respiratory    Allergic rhinitis      Other Visit Diagnoses     Annual physical exam    -  Primary    Chronic fatigue        Relevant Orders    CBC and differential    Comprehensive metabolic panel    Vitamin D 25 hydroxy    Lyme Total Antibody Profile with reflex to WB    History of sexual intercourse        Relevant Orders    Chlamydia/GC amplified DNA by PCR    HIV-1 RNA, quantitative, PCR    RPR    HSV TYPE 1,2 DNA PCR    Acute vaginitis        Relevant Medications    fluconazole (DIFLUCAN) 150 mg tablet          Immunizations and preventive care screenings were discussed with patient today  Appropriate education was printed on patient's after visit summary  Counseling:  Alcohol/drug use: discussed moderation in alcohol intake, the recommendations for healthy alcohol use, and avoidance of illicit drug use  Dental Health: discussed importance of regular tooth brushing, flossing, and dental visits  Injury prevention: discussed safety/seat belts, safety helmets, smoke detectors, carbon dioxide detectors, and smoking near bedding or upholstery  Sexual health: discussed sexually transmitted diseases, partner selection, use of condoms, avoidance of unintended pregnancy, and contraceptive alternatives  · Exercise: the importance of regular exercise/physical activity was discussed  Recommend exercise 3-5 times per week for at least 30 minutes            Return in about 1 year (around 2022) for Annual physical      Chief Complaint:     Chief Complaint Patient presents with    Vaginal Pain     discharge white , x 1 month itchy       History of Present Illness:     Adult Annual Physical   Patient here for a comprehensive physical exam  The patient reports no problems  Diet and Physical Activity  · Diet/Nutrition: well balanced diet, consuming 3-5 servings of fruits/vegetables daily and adequate fiber intake  · Exercise: walking, moderate cardiovascular exercise, 3-4 times a week on average and 30-60 minutes on average  Depression Screening  PHQ-9 Depression Screening    PHQ-9:   Frequency of the following problems over the past two weeks:           General Health  · Sleep: sleeps well and gets 7-8 hours of sleep on average  · Hearing: normal - bilateral   · Vision: no vision problems, most recent eye exam <1 year ago and wears contacts  · Dental: regular dental visits, brushes teeth twice daily and flosses teeth occasionally  /GYN Health  · Last menstrual period: 08/16/21  · Contraceptive method: barrier methods, occasional  · History of STDs?: Chlamydia 2016     Review of Systems:     Review of Systems   Constitutional: Negative for chills and fever  HENT: Negative for ear pain and sore throat  Eyes: Negative for pain and visual disturbance  Respiratory: Negative for cough and shortness of breath  Cardiovascular: Negative for chest pain and palpitations  Gastrointestinal: Negative for abdominal pain and vomiting  Genitourinary: Positive for vaginal bleeding (pt currently on her menstrual period) and vaginal discharge (white, thick, cottage cheese like)  Negative for dysuria, flank pain, frequency, genital sores, hematuria, menstrual problem and vaginal pain  Musculoskeletal: Negative for arthralgias and back pain  Skin: Negative for color change and rash  Neurological: Negative for seizures and syncope  All other systems reviewed and are negative       Past Medical History:     Past Medical History:   Diagnosis Date  Disease of thyroid gland       Past Surgical History:     Past Surgical History:   Procedure Laterality Date    WISDOM TOOTH EXTRACTION        Social History:     Social History     Socioeconomic History    Marital status: Single     Spouse name: None    Number of children: None    Years of education: None    Highest education level: None   Occupational History    None   Tobacco Use    Smoking status: Never Smoker    Smokeless tobacco: Never Used   Vaping Use    Vaping Use: Never used   Substance and Sexual Activity    Alcohol use: Yes     Comment: Socially    Drug use: Never    Sexual activity: Yes     Partners: Male   Other Topics Concern    None   Social History Narrative    None     Social Determinants of Health     Financial Resource Strain:     Difficulty of Paying Living Expenses:    Food Insecurity:     Worried About Running Out of Food in the Last Year:     Ran Out of Food in the Last Year:    Transportation Needs:     Lack of Transportation (Medical):      Lack of Transportation (Non-Medical):    Physical Activity:     Days of Exercise per Week:     Minutes of Exercise per Session:    Stress:     Feeling of Stress :    Social Connections:     Frequency of Communication with Friends and Family:     Frequency of Social Gatherings with Friends and Family:     Attends Scientologist Services:     Active Member of Clubs or Organizations:     Attends Club or Organization Meetings:     Marital Status:    Intimate Partner Violence:     Fear of Current or Ex-Partner:     Emotionally Abused:     Physically Abused:     Sexually Abused:       Family History:     Family History   Problem Relation Age of Onset    No Known Problems Mother     Anxiety disorder Father     No Known Problems Sister     No Known Problems Brother     Alcohol abuse Neg Hx     Substance Abuse Neg Hx       Current Medications:     Current Outpatient Medications   Medication Sig Dispense Refill    levothyroxine 25 mcg tablet Take 1 tablet (25 mcg total) by mouth daily 90 tablet 1    Multiple Vitamins-Minerals (ONE-A-DAY WOMENS PO) Take by mouth      fluconazole (DIFLUCAN) 150 mg tablet Take 1 tablet (150 mg total) by mouth once for 1 dose Take 1 tablet 150mg total by mouth for 1 dose  If not better, take another 150 mg tablet in 3 days  2 tablet 0     No current facility-administered medications for this visit  Allergies: Allergies   Allergen Reactions    Gluten Meal - Food Allergy Anxiety, GI Intolerance and Rash     Category: Allergy;     Pollen Extract Allergic Rhinitis     Category: Allergy; Physical Exam:     /84   Pulse 76   Temp 98 1 °F (36 7 °C)   Resp 16   Ht 5' 5" (1 651 m)   Wt 58 6 kg (129 lb 2 oz)   SpO2 97%   BMI 21 49 kg/m²     Physical Exam  Vitals and nursing note reviewed  Constitutional:       General: She is not in acute distress  Appearance: Normal appearance  She is well-developed  She is not ill-appearing  HENT:      Head: Normocephalic and atraumatic  Eyes:      Conjunctiva/sclera: Conjunctivae normal    Neck:      Vascular: No carotid bruit  Cardiovascular:      Rate and Rhythm: Normal rate and regular rhythm  Pulses: Normal pulses  Heart sounds: Normal heart sounds  No murmur heard  Pulmonary:      Effort: Pulmonary effort is normal  No respiratory distress  Breath sounds: Normal breath sounds  No wheezing  Abdominal:      General: There is no distension  Palpations: Abdomen is soft  There is no mass  Tenderness: There is no abdominal tenderness  There is no guarding or rebound  Hernia: No hernia is present  Musculoskeletal:         General: Normal range of motion  Cervical back: Normal range of motion and neck supple  Right lower leg: No edema  Left lower leg: No edema  Lymphadenopathy:      Cervical: No cervical adenopathy  Skin:     General: Skin is warm and dry        Capillary Refill: Capillary refill takes less than 2 seconds  Neurological:      General: No focal deficit present  Mental Status: She is alert and oriented to person, place, and time  Mental status is at baseline  Psychiatric:         Mood and Affect: Mood normal          Behavior: Behavior normal          Thought Content:  Thought content normal          Judgment: Judgment normal           Anca Smart, [address] [facility]

## 2021-08-20 LAB
B BURGDOR IGG+IGM SER-ACNC: 9
C TRACH DNA SPEC QL NAA+PROBE: NEGATIVE
N GONORRHOEA DNA SPEC QL NAA+PROBE: NEGATIVE
RPR SER QL: NORMAL

## 2021-08-21 LAB
HIV1 RNA # SERPL NAA+PROBE: <20 COPIES/ML
HIV1 RNA SERPL NAA+PROBE-LOG#: NORMAL LOG10COPY/ML

## 2021-08-24 ENCOUNTER — TELEPHONE (OUTPATIENT)
Dept: FAMILY MEDICINE CLINIC | Facility: CLINIC | Age: 31
End: 2021-08-24

## 2021-08-24 LAB
HSV1 DNA SPEC QL NAA+PROBE: NEGATIVE
HSV2 DNA SPEC QL NAA+PROBE: NEGATIVE

## 2021-08-24 NOTE — TELEPHONE ENCOUNTER
----- Message from [address] [place] [address] sent at 8/24/2021  9:37 AM EDT -----  Please let patient know her labs are normal  Thanks!

## 2021-09-19 DIAGNOSIS — E03.9 HYPOTHYROIDISM, UNSPECIFIED TYPE: ICD-10-CM

## 2021-09-20 RX ORDER — LEVOTHYROXINE SODIUM 0.03 MG/1
TABLET ORAL
Qty: 90 TABLET | Refills: 1 | Status: SHIPPED | OUTPATIENT
Start: 2021-09-20 | End: 2022-04-02

## 2021-09-20 NOTE — TELEPHONE ENCOUNTER
Do not see patients last TSH, it appears to be over a year ago  Was ordered 2/2021 please have her do this

## 2021-09-24 ENCOUNTER — APPOINTMENT (OUTPATIENT)
Dept: LAB | Facility: CLINIC | Age: 31
End: 2021-09-24
Payer: COMMERCIAL

## 2021-09-24 DIAGNOSIS — E03.9 HYPOTHYROIDISM, UNSPECIFIED TYPE: ICD-10-CM

## 2021-09-24 LAB — TSH SERPL DL<=0.05 MIU/L-ACNC: 1.55 UIU/ML (ref 0.36–3.74)

## 2021-09-24 PROCEDURE — 84443 ASSAY THYROID STIM HORMONE: CPT

## 2021-09-24 PROCEDURE — 36415 COLL VENOUS BLD VENIPUNCTURE: CPT

## 2021-09-27 ENCOUNTER — TELEPHONE (OUTPATIENT)
Dept: FAMILY MEDICINE CLINIC | Facility: CLINIC | Age: 31
End: 2021-09-27

## 2021-09-27 NOTE — TELEPHONE ENCOUNTER
Lmom regarding blood work results, meds and repeating blood work in [facility]   MRP

## 2021-09-27 NOTE — TELEPHONE ENCOUNTER
----- Message from Camron Loaiza PA-C sent at 9/24/2021  5:50 PM EDT -----  Please let patient know her TSH is normal and she should continue with her current dosing of levothyroxine 25 mcg   Recheck labs 1 year

## 2022-04-02 DIAGNOSIS — E03.9 HYPOTHYROIDISM, UNSPECIFIED TYPE: ICD-10-CM

## 2022-04-02 RX ORDER — LEVOTHYROXINE SODIUM 0.03 MG/1
TABLET ORAL
Qty: 90 TABLET | Refills: 1 | Status: SHIPPED | OUTPATIENT
Start: 2022-04-02

## 2022-04-30 ENCOUNTER — OFFICE VISIT (OUTPATIENT)
Dept: URGENT CARE | Facility: CLINIC | Age: 32
End: 2022-04-30
Payer: COMMERCIAL

## 2022-04-30 VITALS
WEIGHT: 135 LBS | BODY MASS INDEX: 22.49 KG/M2 | OXYGEN SATURATION: 99 % | TEMPERATURE: 99.5 F | HEART RATE: 76 BPM | HEIGHT: 65 IN | RESPIRATION RATE: 20 BRPM

## 2022-04-30 DIAGNOSIS — J02.9 VIRAL PHARYNGITIS: Primary | ICD-10-CM

## 2022-04-30 DIAGNOSIS — J02.9 SORE THROAT: ICD-10-CM

## 2022-04-30 LAB — S PYO AG THROAT QL: NEGATIVE

## 2022-04-30 PROCEDURE — 87880 STREP A ASSAY W/OPTIC: CPT | Performed by: NURSE PRACTITIONER

## 2022-04-30 PROCEDURE — 87070 CULTURE OTHR SPECIMN AEROBIC: CPT | Performed by: NURSE PRACTITIONER

## 2022-04-30 PROCEDURE — 99213 OFFICE O/P EST LOW 20 MIN: CPT | Performed by: NURSE PRACTITIONER

## 2022-04-30 NOTE — PATIENT INSTRUCTIONS
Follow up with pcp  Take meds as directed   Warm salt gargles  Increase fluids    Take zyrtec or allegra for symptoms along with flonase    Rapid strep was negative today and sent for a culture  Call in 2-3 days for throat culture results    Take tylenol and motrin for fever and pain

## 2022-04-30 NOTE — PROGRESS NOTES
NAME: Yu Ling is a 28 y o  female  : 1990    MRN: [id number]    Pulse 76   Temp 99 5 °F (37 5 °C) (Tympanic)   Resp 20   Ht 5' 5" (1 651 m)   Wt 61 2 kg (135 lb)   SpO2 99%   BMI 22 47 kg/m²     Assessment and Plan   Viral pharyngitis [J02 9]  1  Viral pharyngitis     2  Sore throat  POCT rapid strepA    Throat culture       Cris was seen today for cough  Diagnoses and all orders for this visit:    Viral pharyngitis    Sore throat  -     POCT rapid strepA  -     Throat culture    rapid strep negative, throat culture sent     Patient Instructions   Patient Instructions   Follow up with pcp  Take meds as directed   Warm salt gargles  Increase fluids    Take zyrtec or allegra for symptoms along with flonase    Rapid strep was negative today and sent for a culture  Call in 2-3 days for throat culture results  Take tylenol and motrin for fever and pain           Proceed to the nearest ER if symptoms worsen, Follow up with your PCP  Continue to social distance, wash your hands, and wear your masks  Please continue to follow the CDC  gov guidelines daily for they are subject to change on COVID-19    Chief Complaint     Chief Complaint   Patient presents with    Cough     started with cough, fatigue, and sore throat on tuesday, no fevers, advil/tylenol, sick contacts at party, covid test (-) on tuesday, pt is NOT in distress, does have seasonal allergies, does not want to be swab, pt is is FULLY vaccinated for covid an flu          History of Present Illness     29 yo F here today with cough, fatigue, sore throat started Tuesday, no fever but has taken tylenol and advil  Had a party and was around others who are sick, she tested negative for covid Tuesday and currently in no distress  She has a hx of seasonal allergies, and is fully vaccinated for covid and the flu  Pt doesn't want a COVID PCR test/ or flu test done today   No fever present      Review of Systems   Review of Systems Constitutional: Positive for fever  HENT: Positive for congestion, postnasal drip and sore throat  Negative for ear pain and rhinorrhea  Eyes: Negative  Respiratory: Positive for cough  Cardiovascular: Negative  Gastrointestinal: Negative  Genitourinary: Negative  Musculoskeletal: Negative  Skin: Negative  Neurological: Negative  Psychiatric/Behavioral: Negative  Current Medications       Current Outpatient Medications:     levothyroxine 25 mcg tablet, TAKE 1 TABLET BY MOUTH EVERY DAY, Disp: 90 tablet, Rfl: 1    Multiple Vitamins-Minerals (ONE-A-DAY WOMENS PO), Take by mouth, Disp: , Rfl:     Current Allergies     Allergies as of 04/30/2022 - Reviewed 04/30/2022   Allergen Reaction Noted    Gluten meal - food allergy Anxiety, GI Intolerance, and Rash 04/04/2017    Pollen extract Allergic Rhinitis 06/08/2012              Past Medical History:   Diagnosis Date    Disease of thyroid gland        Past Surgical History:   Procedure Laterality Date    WISDOM TOOTH EXTRACTION         Family History   Problem Relation Age of Onset    No Known Problems Mother     Anxiety disorder Father     No Known Problems Sister     No Known Problems Brother     Alcohol abuse Neg Hx     Substance Abuse Neg Hx          Medications have been verified  The following portions of the patient's history were reviewed and updated as appropriate: allergies, current medications, past family history, past medical history, past social history, past surgical history and problem list     Objective   Pulse 76   Temp 99 5 °F (37 5 °C) (Tympanic)   Resp 20   Ht 5' 5" (1 651 m)   Wt 61 2 kg (135 lb)   SpO2 99%   BMI 22 47 kg/m²      Physical Exam     Physical Exam  Constitutional:       General: She is awake  She is not in acute distress  Appearance: Normal appearance  She is well-developed  She is not ill-appearing  HENT:      Head: Normocephalic        Right Ear: Hearing, tympanic membrane, ear canal and external ear normal  There is no impacted cerumen  Left Ear: Hearing, tympanic membrane, ear canal and external ear normal  There is no impacted cerumen  Nose: Rhinorrhea present  No mucosal edema or congestion  Right Turbinates: Not swollen or pale  Left Turbinates: Not swollen or pale  Right Sinus: No maxillary sinus tenderness  Left Sinus: No maxillary sinus tenderness  Mouth/Throat:      Lips: Pink  Mouth: Mucous membranes are moist       Pharynx: Oropharynx is clear  Uvula midline  No pharyngeal swelling, oropharyngeal exudate, posterior oropharyngeal erythema or uvula swelling  Tonsils: No tonsillar exudate or tonsillar abscesses  Comments: Clear drainage in back of throat  Eyes:      General: Lids are normal       Extraocular Movements: Extraocular movements intact  Pupils: Pupils are equal, round, and reactive to light  Cardiovascular:      Rate and Rhythm: Normal rate and regular rhythm  Heart sounds: Normal heart sounds  Pulmonary:      Effort: Pulmonary effort is normal       Breath sounds: Normal breath sounds and air entry  No decreased breath sounds, wheezing, rhonchi or rales  Skin:     General: Skin is warm  Capillary Refill: Capillary refill takes less than 2 seconds  Neurological:      General: No focal deficit present  Mental Status: She is alert and oriented to person, place, and time  Psychiatric:         Attention and Perception: Attention normal          Mood and Affect: Mood normal          Behavior: Behavior is cooperative  Note: Portions of this record may have been created with voice recognition software  Occasional wrong word or "sound a like" substitutions may have occurred due to the inherent limitations of voice recognition software  Please read the chart carefully and recognize, using context, where substitutions have occurred  AMITA Montes

## 2022-05-02 LAB — BACTERIA THROAT CULT: NORMAL

## 2022-05-03 ENCOUNTER — OFFICE VISIT (OUTPATIENT)
Dept: FAMILY MEDICINE CLINIC | Facility: CLINIC | Age: 32
End: 2022-05-03
Payer: COMMERCIAL

## 2022-05-03 VITALS
WEIGHT: 127 LBS | HEART RATE: 80 BPM | HEIGHT: 65 IN | SYSTOLIC BLOOD PRESSURE: 114 MMHG | BODY MASS INDEX: 21.16 KG/M2 | DIASTOLIC BLOOD PRESSURE: 72 MMHG | RESPIRATION RATE: 14 BRPM | TEMPERATURE: 99.2 F | OXYGEN SATURATION: 98 %

## 2022-05-03 DIAGNOSIS — J06.9 UPPER RESPIRATORY TRACT INFECTION, UNSPECIFIED TYPE: Primary | ICD-10-CM

## 2022-05-03 PROCEDURE — 3725F SCREEN DEPRESSION PERFORMED: CPT | Performed by: NURSE PRACTITIONER

## 2022-05-03 PROCEDURE — 99214 OFFICE O/P EST MOD 30 MIN: CPT | Performed by: NURSE PRACTITIONER

## 2022-05-03 PROCEDURE — 1036F TOBACCO NON-USER: CPT | Performed by: NURSE PRACTITIONER

## 2022-05-03 PROCEDURE — 3008F BODY MASS INDEX DOCD: CPT | Performed by: NURSE PRACTITIONER

## 2022-05-03 RX ORDER — AZITHROMYCIN 250 MG/1
TABLET, FILM COATED ORAL
Qty: 6 TABLET | Refills: 0 | Status: SHIPPED | OUTPATIENT
Start: 2022-05-03 | End: 2022-05-08

## 2022-05-03 RX ORDER — HYDROCODONE POLISTIREX AND CHLORPHENIRAMINE POLISTIREX 10; 8 MG/5ML; MG/5ML
5 SUSPENSION, EXTENDED RELEASE ORAL
Qty: 120 ML | Refills: 0 | Status: SHIPPED | OUTPATIENT
Start: 2022-05-03 | End: 2022-07-25 | Stop reason: ALTCHOICE

## 2022-05-03 NOTE — PROGRESS NOTES
Assessment/Plan:     Diagnoses and all orders for this visit:    Upper respiratory tract infection, unspecified type  -     hydrocodone-chlorpheniramine polistirex (TUSSIONEX) 10-8 mg/5 mL ER suspension; Take 5 mL by mouth daily at bedtime as needed for cough Max Daily Amount: 5 mL  -     azithromycin (Zithromax) 250 mg tablet; Take 2 tablets (500 mg total) by mouth daily for 1 day, THEN 1 tablet (250 mg total) daily for 4 days  Anum Sour ordered  Medication and s/e reviewed  Patient to take Mucinex BID w/ full glass of water & Tussionex HS PRN  Medication and s/e reviewed  She is to utilize warm salt water gargles, warm tea w/ honey, fluids & rest  Patient is encouraged to call our office for any questions/concerns, persistent or worsening symptoms  Patient states they understand and agree with treatment plan  Pt to f/u if symptoms worsen over the next 48 hours or sooner PRN  Subjective:      Patient ID: Emely Engle is a 28 y o  female  Patient presents for symptoms that started over a week ago including sore throat, clear rhinorrhea, persistent productive cough with thick yellow/brown mucus  She denies fever, chills, body aches, NVD, SOB, CP or wheezing  She was seen by [facility] of Saturday who tested her her for strep and this was negative  They did encourage her to start allergy medications  She has been trying to utilize Dayquil/Nyquil, Mucinex & Zyrtec without much relief  She notes she has not been able to sleep and has had to call out of work because of how she feels  The following portions of the patient's history were reviewed and updated as appropriate: allergies, current medications, past family history, past medical history, past social history, past surgical history and problem list     Review of Systems    As noted per HPI      Objective:      /72   Pulse 80   Temp 99 2 °F (37 3 °C) (Oral)   Resp 14   Ht 5' 5" (1 651 m)   Wt 57 6 kg (127 lb)   SpO2 98%   BMI 21 13 kg/m²          Physical Exam  Vitals reviewed  Constitutional:       Appearance: She is well-developed  HENT:      Right Ear: Tympanic membrane, ear canal and external ear normal       Left Ear: Tympanic membrane, ear canal and external ear normal       Nose: Mucosal edema and rhinorrhea present  Rhinorrhea is clear  Right Turbinates: Enlarged and swollen  Left Turbinates: Enlarged and swollen  Right Sinus: No maxillary sinus tenderness or frontal sinus tenderness  Left Sinus: No maxillary sinus tenderness or frontal sinus tenderness  Mouth/Throat:      Mouth: Mucous membranes are moist       Pharynx: Posterior oropharyngeal erythema present  No oropharyngeal exudate  Cardiovascular:      Pulses: Normal pulses  Heart sounds: Normal heart sounds  No murmur heard  Pulmonary:      Effort: Pulmonary effort is normal  No respiratory distress  Breath sounds: Normal breath sounds  No wheezing  Neurological:      Mental Status: She is alert and oriented to person, place, and time  Mental status is at baseline  Psychiatric:         Mood and Affect: Mood normal          Behavior: Behavior normal          Thought Content:  Thought content normal          Judgment: Judgment normal

## 2022-05-03 NOTE — LETTER
May 3, 2022     Patient: Mariya Ponce  YOB: 1990  Date of Visit: 5/3/2022      To Whom it May Concern:    Mariya Ponce is under my professional care  Jacqueline Otto was seen in my office on 5/3/2022  Please excuse her from work today 05/03 & Wednesday 05/04  If you have any questions or concerns, please don't hesitate to call           Sincerely,          AMITA Kim        CC: No Recipients

## 2022-07-18 ENCOUNTER — RA CDI HCC (OUTPATIENT)
Dept: OTHER | Facility: HOSPITAL | Age: 32
End: 2022-07-18

## 2022-07-18 NOTE — PROGRESS NOTES
[facility]       Chart reviewed, no opportunity found: CHART REVIEWED, NO OPPORTUNITY FOUND        Patients Insurance        Commercial Insurance: [address] [place] [address]

## 2022-07-25 ENCOUNTER — OFFICE VISIT (OUTPATIENT)
Dept: FAMILY MEDICINE CLINIC | Facility: CLINIC | Age: 32
End: 2022-07-25

## 2022-07-25 VITALS
WEIGHT: 125.5 LBS | RESPIRATION RATE: 16 BRPM | DIASTOLIC BLOOD PRESSURE: 70 MMHG | HEART RATE: 70 BPM | SYSTOLIC BLOOD PRESSURE: 110 MMHG | BODY MASS INDEX: 20.17 KG/M2 | HEIGHT: 66 IN

## 2022-07-25 DIAGNOSIS — E03.8 OTHER SPECIFIED HYPOTHYROIDISM: ICD-10-CM

## 2022-07-25 DIAGNOSIS — Z00.00 ANNUAL PHYSICAL EXAM: Primary | ICD-10-CM

## 2022-07-25 PROCEDURE — 99499 UNLISTED E&M SERVICE: CPT | Performed by: PHYSICIAN ASSISTANT

## 2022-07-25 NOTE — PROGRESS NOTES
ADULT ANNUAL PHYSICAL  [facility] PRACTICE    NAME: Katherin Cervantes  AGE: 28 y o  SEX: female  : 1990     DATE: 2022     Assessment and Plan:     Healthy 28year old female    Immunizations and preventive care screenings were discussed with patient today  Appropriate education was printed on patient's after visit summary  Counseling:  Alcohol/drug use: discussed moderation in alcohol intake, the recommendations for healthy alcohol use, and avoidance of illicit drug use  Dental Health: discussed importance of regular tooth brushing, flossing, and dental visits  Injury prevention: discussed safety/seat belts, safety helmets, smoke detectors, carbon dioxide detectors, and smoking near bedding or upholstery  Sexual health: discussed sexually transmitted diseases, partner selection, use of condoms, avoidance of unintended pregnancy, and contraceptive alternatives  · Exercise: the importance of regular exercise/physical activity was discussed  Recommend exercise 3-5 times per week for at least 30 minutes  Return in 1 year (on 2023)  Chief Complaint:     Chief Complaint   Patient presents with    Physical Exam      History of Present Illness:     Adult Annual Physical   Patient here for a comprehensive physical exam  The patient reports no problems  Diet and Physical Activity  · Diet/Nutrition: well balanced diet  · Exercise: walking, moderate cardiovascular exercise, 5-7 times a week on average and running  Depression Screening  PHQ-2/9 Depression Screening         General Health  · Sleep: sleeps well and gets 7-8 hours of sleep on average  · Hearing: normal - bilateral   · Vision: goes for regular eye exams, most recent eye exam <1 year ago and wears glasses and contacts  · Dental: regular dental visits and brushes teeth twice daily         /GYN Health  · Last menstrual period: regular  · Pap due now with Dr Janel Jamil     Review of Systems:     Review of Systems   Constitutional: Negative for chills and fever  HENT: Negative for ear pain and sore throat  Eyes: Negative for pain and visual disturbance  Respiratory: Negative for cough and shortness of breath  Cardiovascular: Negative for chest pain and palpitations  Gastrointestinal: Negative for abdominal pain and vomiting  Genitourinary: Negative for dysuria and hematuria  Musculoskeletal: Negative for arthralgias and back pain  Skin: Negative for color change and rash  Neurological: Negative for seizures and syncope  All other systems reviewed and are negative       Past Medical History:     Past Medical History:   Diagnosis Date    Disease of thyroid gland       Past Surgical History:     Past Surgical History:   Procedure Laterality Date    WISDOM TOOTH EXTRACTION        Social History:     Social History     Socioeconomic History    Marital status: Single     Spouse name: None    Number of children: None    Years of education: None    Highest education level: None   Occupational History    None   Tobacco Use    Smoking status: Never Smoker    Smokeless tobacco: Never Used   Vaping Use    Vaping Use: Never used   Substance and Sexual Activity    Alcohol use: Yes     Comment: Socially    Drug use: Never    Sexual activity: Yes     Partners: Male   Other Topics Concern    None   Social History Narrative    None     Social Determinants of Health     Financial Resource Strain: Not on file   Food Insecurity: Not on file   Transportation Needs: Not on file   Physical Activity: Not on file   Stress: Not on file   Social Connections: Not on file   Intimate Partner Violence: Not on file   Housing Stability: Not on file      Family History:     Family History   Problem Relation Age of Onset    No Known Problems Mother     Anxiety disorder Father     No Known Problems Sister     No Known Problems Brother     Alcohol abuse Neg Hx     Substance Abuse Neg Hx       Current Medications:     Current Outpatient Medications   Medication Sig Dispense Refill    levothyroxine 25 mcg tablet TAKE 1 TABLET BY MOUTH EVERY DAY 90 tablet 1    Multiple Vitamins-Minerals (ONE-A-DAY WOMENS PO) Take by mouth       No current facility-administered medications for this visit  Allergies: Allergies   Allergen Reactions    Gluten Meal - Food Allergy Anxiety, GI Intolerance and Rash     Category: Allergy;     Pollen Extract Allergic Rhinitis     Category: Allergy; Physical Exam:     /70   Pulse 70   Resp 16   Ht 5' 5 5" (1 664 m)   Wt 56 9 kg (125 lb 8 oz)   BMI 20 57 kg/m²     Physical Exam  Constitutional:       Appearance: Normal appearance  She is well-developed and normal weight  HENT:      Head: Normocephalic and atraumatic  Right Ear: Hearing normal       Left Ear: Hearing normal       Mouth/Throat:      Pharynx: Uvula midline  Eyes:      Extraocular Movements: Extraocular movements intact  Conjunctiva/sclera: Conjunctivae normal       Pupils: Pupils are equal, round, and reactive to light  Neck:      Thyroid: No thyromegaly  Cardiovascular:      Rate and Rhythm: Normal rate and regular rhythm  Pulses: Normal pulses  Heart sounds: Normal heart sounds  No murmur heard  Pulmonary:      Effort: Pulmonary effort is normal       Breath sounds: Normal breath sounds  Abdominal:      General: Abdomen is flat  Bowel sounds are normal  There is no distension  Palpations: Abdomen is soft  There is no mass  Tenderness: There is no abdominal tenderness  Musculoskeletal:         General: Normal range of motion  Cervical back: Normal range of motion and neck supple  Lymphadenopathy:      Cervical: No cervical adenopathy  Skin:     General: Skin is warm  Neurological:      General: No focal deficit present  Mental Status: She is alert and oriented to person, place, and time  Cranial Nerves: No cranial nerve deficit  Deep Tendon Reflexes: Reflexes normal    Psychiatric:         Mood and Affect: Mood normal          Behavior: Behavior normal          Thought Content:  Thought content normal          Judgment: Judgment normal           SAGE Yo

## 2022-07-25 NOTE — PATIENT INSTRUCTIONS

## 2022-07-29 ENCOUNTER — CLINICAL SUPPORT (OUTPATIENT)
Dept: FAMILY MEDICINE CLINIC | Facility: CLINIC | Age: 32
End: 2022-07-29
Payer: COMMERCIAL

## 2022-07-29 DIAGNOSIS — Z11.1 SCREENING FOR TUBERCULOSIS: Primary | ICD-10-CM

## 2022-07-29 PROCEDURE — 86580 TB INTRADERMAL TEST: CPT

## 2022-08-01 ENCOUNTER — CLINICAL SUPPORT (OUTPATIENT)
Dept: FAMILY MEDICINE CLINIC | Facility: CLINIC | Age: 32
End: 2022-08-01

## 2022-08-01 DIAGNOSIS — Z11.1 ENCOUNTER FOR PPD SKIN TEST READING: Primary | ICD-10-CM

## 2022-08-01 LAB
INDURATION: 0 MM
TB SKIN TEST: NEGATIVE

## 2022-11-11 DIAGNOSIS — E03.9 HYPOTHYROIDISM, UNSPECIFIED TYPE: ICD-10-CM

## 2022-11-11 RX ORDER — LEVOTHYROXINE SODIUM 0.03 MG/1
TABLET ORAL
Qty: 90 TABLET | Refills: 1 | Status: SHIPPED | OUTPATIENT
Start: 2022-11-11

## 2023-05-31 DIAGNOSIS — E03.9 HYPOTHYROIDISM, UNSPECIFIED TYPE: ICD-10-CM

## 2023-05-31 RX ORDER — LEVOTHYROXINE SODIUM 0.03 MG/1
TABLET ORAL
Qty: 90 TABLET | Refills: 1 | Status: SHIPPED | OUTPATIENT
Start: 2023-05-31

## 2023-06-22 LAB — TSH SERPL DL<=0.005 MIU/L-ACNC: 3.52 UIU/ML (ref 0.45–4.5)

## 2024-01-05 ENCOUNTER — TELEPHONE (OUTPATIENT)
Dept: FAMILY MEDICINE CLINIC | Facility: CLINIC | Age: 34
End: 2024-01-05

## 2024-01-05 DIAGNOSIS — E03.9 HYPOTHYROIDISM, UNSPECIFIED TYPE: ICD-10-CM

## 2024-01-05 RX ORDER — LEVOTHYROXINE SODIUM 0.03 MG/1
TABLET ORAL
Qty: 30 TABLET | Refills: 0 | Status: SHIPPED | OUTPATIENT
Start: 2024-01-05

## 2024-01-05 NOTE — TELEPHONE ENCOUNTER
----- Message from Cesilia Sheldon PA-C sent at 1/5/2024  2:29 PM EST -----  Can you send a message to [facility], patient has moved out of the area and no longer plans to come here. Refused a visit here for further care. Thanks.

## 2024-01-05 NOTE — TELEPHONE ENCOUNTER
Patient returned call and asked if her getting blood work on 06/21/23 is sufficient enough to be considered being seen in the last year.  She said she moved out of the area and will have to try to figure out what she is going to do next.  She would not schedule an appointment with our practice.

## 2024-01-31 ENCOUNTER — RA CDI HCC (OUTPATIENT)
Dept: OTHER | Facility: HOSPITAL | Age: 34
End: 2024-01-31

## 2024-01-31 NOTE — PROGRESS NOTES
HCC coding opportunities       Chart reviewed, no opportunity found: CHART REVIEWED, NO OPPORTUNITY FOUND      This is a reminder to address (resolve/update/assess) ALL HCC (risk adjustment) codes as found on active problem list for 2024 as patient scores reset to zero LOIS.  Also, just a reminder to please review and assess all other chronic conditions for 2024  Patients Insurance        Commercial Insurance: [employer]

## 2024-02-07 ENCOUNTER — OFFICE VISIT (OUTPATIENT)
Dept: FAMILY MEDICINE CLINIC | Facility: CLINIC | Age: 34
End: 2024-02-07
Payer: COMMERCIAL

## 2024-02-07 VITALS
SYSTOLIC BLOOD PRESSURE: 122 MMHG | DIASTOLIC BLOOD PRESSURE: 80 MMHG | HEART RATE: 69 BPM | TEMPERATURE: 98 F | WEIGHT: 127 LBS | HEIGHT: 65 IN | RESPIRATION RATE: 16 BRPM | BODY MASS INDEX: 21.16 KG/M2 | OXYGEN SATURATION: 98 %

## 2024-02-07 DIAGNOSIS — E03.9 HYPOTHYROIDISM, UNSPECIFIED TYPE: ICD-10-CM

## 2024-02-07 DIAGNOSIS — Z00.00 ANNUAL PHYSICAL EXAM: Primary | ICD-10-CM

## 2024-02-07 PROCEDURE — 99395 PREV VISIT EST AGE 18-39: CPT | Performed by: PHYSICIAN ASSISTANT

## 2024-02-07 RX ORDER — LEVOTHYROXINE SODIUM 0.03 MG/1
25 TABLET ORAL DAILY
Qty: 90 TABLET | Refills: 3 | Status: SHIPPED | OUTPATIENT
Start: 2024-02-07

## 2024-02-07 NOTE — PROGRESS NOTES
ADULT ANNUAL PHYSICAL  [facility] PRACTICE    NAME: Cris Carvalho  AGE: 33 y.o. SEX: female  : 1990     DATE: 2024     Assessment and Plan:       Healthy 33 year old female    Immunizations and preventive care screenings were discussed with patient today. Appropriate education was printed on patient's after visit summary.    Counseling:  Alcohol/drug use: discussed moderation in alcohol intake, the recommendations for healthy alcohol use, and avoidance of illicit drug use.  Dental Health: discussed importance of regular tooth brushing, flossing, and dental visits.  Injury prevention: discussed safety/seat belts, safety helmets, smoke detectors, carbon dioxide detectors, and smoking near bedding or upholstery.  Sexual health: discussed sexually transmitted diseases, partner selection, use of condoms, avoidance of unintended pregnancy, and contraceptive alternatives.  Exercise: the importance of regular exercise/physical activity was discussed. Recommend exercise 3-5 times per week for at least 30 minutes.   Continue levothyroxine 25 mcg once daily, check labs in summer 2024         Return in 1 year (on 2025).     Chief Complaint:     Chief Complaint   Patient presents with    Physical Exam      History of Present Illness:     Adult Annual Physical   Patient here for a comprehensive physical exam. The patient reports no problems.    Diet and Physical Activity  Diet/Nutrition: well balanced diet.   Exercise: walking.      Depression Screening  PHQ-2/9 Depression Screening    Little interest or pleasure in doing things: 0 - not at all  Feeling down, depressed, or hopeless: 0 - not at all  PHQ-2 Score: 0  PHQ-2 Interpretation: Negative depression screen       General Health  Sleep: sleeps well.   Hearing: normal - bilateral.  Vision: goes for regular eye exams and wears glasses and contacts.   Dental: regular dental visits and brushes  teeth twice daily.       /GYN Health  Follows with gynecology? yes   Last menstrual period: regular         Review of Systems:     Review of Systems   Constitutional: Negative.    HENT: Negative.     Eyes: Negative.    Respiratory: Negative.     Cardiovascular: Negative.    Gastrointestinal: Negative.    Endocrine: Negative.    Genitourinary: Negative.    Musculoskeletal: Negative.    Skin: Negative.    Allergic/Immunologic: Negative.    Neurological: Negative.    Hematological: Negative.    Psychiatric/Behavioral: Negative.        Past Medical History:     Past Medical History:   Diagnosis Date    Disease of thyroid gland       Past Surgical History:     Past Surgical History:   Procedure Laterality Date    WISDOM TOOTH EXTRACTION        Social History:     Social History     Socioeconomic History    Marital status: Single     Spouse name: None    Number of children: None    Years of education: None    Highest education level: None   Occupational History    None   Tobacco Use    Smoking status: Never    Smokeless tobacco: Never   Vaping Use    Vaping status: Never Used   Substance and Sexual Activity    Alcohol use: Yes     Comment: Socially    Drug use: Never    Sexual activity: Yes     Partners: Male   Other Topics Concern    None   Social History Narrative    None     Social Determinants of Health     Financial Resource Strain: Not on file   Food Insecurity: Not on file   Transportation Needs: Not on file   Physical Activity: Not on file   Stress: Not on file   Social Connections: Not on file   Intimate Partner Violence: Not on file   Housing Stability: Not on file      Family History:     Family History   Problem Relation Age of Onset    No Known Problems Mother     Anxiety disorder Father     No Known Problems Sister     No Known Problems Brother     Alcohol abuse Neg Hx     Substance Abuse Neg Hx       Current Medications:     Current Outpatient Medications   Medication Sig Dispense Refill    levothyroxine  "25 mcg tablet TAKE 1 TABLET BY MOUTH EVERY DAY 30 tablet 0    Multiple Vitamins-Minerals (ONE-A-DAY WOMENS PO) Take by mouth       No current facility-administered medications for this visit.      Allergies:     Allergies   Allergen Reactions    Gluten Meal - Food Allergy Anxiety, GI Intolerance and Rash     Category: Allergy;     Pollen Extract Allergic Rhinitis     Category: Allergy;       Physical Exam:     /80   Pulse 69   Temp 98 °F (36.7 °C) (Temporal)   Resp 16   Ht 5' 5\" (1.651 m)   Wt 57.6 kg (127 lb)   SpO2 98%   BMI 21.13 kg/m²     Physical Exam  Constitutional:       Appearance: Normal appearance. She is well-developed and normal weight.   HENT:      Head: Normocephalic and atraumatic.      Right Ear: Hearing normal.      Left Ear: Hearing normal.      Mouth/Throat:      Pharynx: Uvula midline.   Eyes:      Extraocular Movements: Extraocular movements intact.      Conjunctiva/sclera: Conjunctivae normal.      Pupils: Pupils are equal, round, and reactive to light.   Neck:      Thyroid: No thyromegaly.   Cardiovascular:      Rate and Rhythm: Normal rate and regular rhythm.      Heart sounds: Normal heart sounds. No murmur heard.  Pulmonary:      Effort: Pulmonary effort is normal.      Breath sounds: Normal breath sounds.   Abdominal:      General: Bowel sounds are normal. There is no distension.      Palpations: Abdomen is soft. There is no mass.      Tenderness: There is no abdominal tenderness.   Musculoskeletal:         General: Normal range of motion.      Cervical back: Normal range of motion and neck supple.   Lymphadenopathy:      Cervical: No cervical adenopathy.   Skin:     General: Skin is warm.   Neurological:      General: No focal deficit present.      Mental Status: She is alert and oriented to person, place, and time.      Cranial Nerves: No cranial nerve deficit.      Deep Tendon Reflexes: Reflexes normal.   Psychiatric:         Mood and Affect: Mood normal.         Behavior: " Behavior normal.         Thought Content: Thought content normal.         Judgment: Judgment normal.          Cesilia Sheldon PA-C   [facility]

## 2025-02-28 DIAGNOSIS — E03.9 HYPOTHYROIDISM, UNSPECIFIED TYPE: ICD-10-CM

## 2025-02-28 RX ORDER — LEVOTHYROXINE SODIUM 25 UG/1
25 TABLET ORAL DAILY
Qty: 30 TABLET | Refills: 0 | Status: SHIPPED | OUTPATIENT
Start: 2025-02-28